# Patient Record
Sex: FEMALE | Race: WHITE | NOT HISPANIC OR LATINO
[De-identification: names, ages, dates, MRNs, and addresses within clinical notes are randomized per-mention and may not be internally consistent; named-entity substitution may affect disease eponyms.]

---

## 2017-03-29 ENCOUNTER — RESULT REVIEW (OUTPATIENT)
Age: 62
End: 2017-03-29

## 2017-11-29 LAB — CYTOLOGY CVX/VAG DOC THIN PREP: NORMAL

## 2018-06-07 ENCOUNTER — APPOINTMENT (OUTPATIENT)
Dept: FAMILY MEDICINE | Facility: CLINIC | Age: 63
End: 2018-06-07
Payer: MEDICARE

## 2018-06-07 VITALS
TEMPERATURE: 98 F | RESPIRATION RATE: 20 BRPM | DIASTOLIC BLOOD PRESSURE: 60 MMHG | HEART RATE: 60 BPM | BODY MASS INDEX: 24.27 KG/M2 | HEIGHT: 66 IN | WEIGHT: 151 LBS | SYSTOLIC BLOOD PRESSURE: 110 MMHG

## 2018-06-07 DIAGNOSIS — Z01.818 ENCOUNTER FOR OTHER PREPROCEDURAL EXAMINATION: ICD-10-CM

## 2018-06-07 PROCEDURE — 99214 OFFICE O/P EST MOD 30 MIN: CPT

## 2018-06-08 ENCOUNTER — APPOINTMENT (OUTPATIENT)
Dept: FAMILY MEDICINE | Facility: CLINIC | Age: 63
End: 2018-06-08

## 2019-03-20 ENCOUNTER — RESULT REVIEW (OUTPATIENT)
Age: 64
End: 2019-03-20

## 2019-08-12 ENCOUNTER — APPOINTMENT (OUTPATIENT)
Dept: FAMILY MEDICINE | Facility: CLINIC | Age: 64
End: 2019-08-12
Payer: MEDICARE

## 2019-08-12 VITALS
DIASTOLIC BLOOD PRESSURE: 90 MMHG | RESPIRATION RATE: 18 BRPM | BODY MASS INDEX: 24.43 KG/M2 | OXYGEN SATURATION: 98 % | HEART RATE: 94 BPM | HEIGHT: 66 IN | WEIGHT: 152 LBS | SYSTOLIC BLOOD PRESSURE: 140 MMHG

## 2019-08-12 DIAGNOSIS — K20.9 ESOPHAGITIS, UNSPECIFIED: ICD-10-CM

## 2019-08-12 PROCEDURE — 99213 OFFICE O/P EST LOW 20 MIN: CPT

## 2019-08-12 RX ORDER — METRONIDAZOLE 7.5 MG/G
0.75 CREAM TOPICAL
Qty: 45 | Refills: 0 | Status: DISCONTINUED | COMMUNITY
Start: 2018-05-30 | End: 2019-08-12

## 2019-09-11 ENCOUNTER — RX RENEWAL (OUTPATIENT)
Age: 64
End: 2019-09-11

## 2020-11-12 ENCOUNTER — APPOINTMENT (OUTPATIENT)
Dept: FAMILY MEDICINE | Facility: CLINIC | Age: 65
End: 2020-11-12
Payer: MEDICARE

## 2020-11-12 VITALS
WEIGHT: 152 LBS | HEART RATE: 86 BPM | RESPIRATION RATE: 18 BRPM | SYSTOLIC BLOOD PRESSURE: 136 MMHG | DIASTOLIC BLOOD PRESSURE: 98 MMHG | BODY MASS INDEX: 24.53 KG/M2 | OXYGEN SATURATION: 98 %

## 2020-11-12 VITALS — DIASTOLIC BLOOD PRESSURE: 88 MMHG | SYSTOLIC BLOOD PRESSURE: 130 MMHG

## 2020-11-12 DIAGNOSIS — Z00.00 ENCOUNTER FOR GENERAL ADULT MEDICAL EXAMINATION W/OUT ABNORMAL FINDINGS: ICD-10-CM

## 2020-11-12 DIAGNOSIS — Z23 ENCOUNTER FOR IMMUNIZATION: ICD-10-CM

## 2020-11-12 DIAGNOSIS — K63.5 POLYP OF COLON: ICD-10-CM

## 2020-11-12 LAB
ALBUMIN SERPL ELPH-MCNC: 4.7 G/DL
ALP BLD-CCNC: 111 U/L
ALT SERPL-CCNC: 12 U/L
ANION GAP SERPL CALC-SCNC: 14 MMOL/L
APPEARANCE: CLEAR
AST SERPL-CCNC: 14 U/L
BACTERIA: NEGATIVE
BASOPHILS # BLD AUTO: 0.04 K/UL
BASOPHILS NFR BLD AUTO: 0.7 %
BILIRUB SERPL-MCNC: 0.5 MG/DL
BILIRUBIN URINE: NEGATIVE
BLOOD URINE: NEGATIVE
BUN SERPL-MCNC: 11 MG/DL
CALCIUM SERPL-MCNC: 9.9 MG/DL
CHLORIDE SERPL-SCNC: 101 MMOL/L
CHOLEST SERPL-MCNC: 280 MG/DL
CO2 SERPL-SCNC: 28 MMOL/L
COLOR: COLORLESS
CREAT SERPL-MCNC: 0.59 MG/DL
EOSINOPHIL # BLD AUTO: 0.15 K/UL
EOSINOPHIL NFR BLD AUTO: 2.6 %
ESTIMATED AVERAGE GLUCOSE: 105 MG/DL
GLUCOSE QUALITATIVE U: NEGATIVE
GLUCOSE SERPL-MCNC: 99 MG/DL
HBA1C MFR BLD HPLC: 5.3 %
HCT VFR BLD CALC: 47.8 %
HDLC SERPL-MCNC: 91 MG/DL
HGB BLD-MCNC: 16 G/DL
HYALINE CASTS: 0 /LPF
IMM GRANULOCYTES NFR BLD AUTO: 0.4 %
KETONES URINE: NEGATIVE
LDLC SERPL CALC-MCNC: 164 MG/DL
LEUKOCYTE ESTERASE URINE: NEGATIVE
LYMPHOCYTES # BLD AUTO: 1.95 K/UL
LYMPHOCYTES NFR BLD AUTO: 34.2 %
MAN DIFF?: NORMAL
MCHC RBC-ENTMCNC: 31.7 PG
MCHC RBC-ENTMCNC: 33.5 GM/DL
MCV RBC AUTO: 94.8 FL
MICROSCOPIC-UA: NORMAL
MONOCYTES # BLD AUTO: 0.42 K/UL
MONOCYTES NFR BLD AUTO: 7.4 %
NEUTROPHILS # BLD AUTO: 3.13 K/UL
NEUTROPHILS NFR BLD AUTO: 54.7 %
NITRITE URINE: NEGATIVE
NONHDLC SERPL-MCNC: 190 MG/DL
PH URINE: 6.5
PLATELET # BLD AUTO: 225 K/UL
POTASSIUM SERPL-SCNC: 4.2 MMOL/L
PROT SERPL-MCNC: 6.4 G/DL
PROTEIN URINE: NEGATIVE
RBC # BLD: 5.04 M/UL
RBC # FLD: 12.6 %
RED BLOOD CELLS URINE: 1 /HPF
SODIUM SERPL-SCNC: 143 MMOL/L
SPECIFIC GRAVITY URINE: 1
SQUAMOUS EPITHELIAL CELLS: 0 /HPF
TRIGL SERPL-MCNC: 129 MG/DL
UROBILINOGEN URINE: NORMAL
WBC # FLD AUTO: 5.71 K/UL
WHITE BLOOD CELLS URINE: 0 /HPF

## 2020-11-12 PROCEDURE — 90686 IIV4 VACC NO PRSV 0.5 ML IM: CPT

## 2020-11-12 PROCEDURE — G0438: CPT

## 2020-11-12 PROCEDURE — 36415 COLL VENOUS BLD VENIPUNCTURE: CPT

## 2020-11-12 PROCEDURE — G0008: CPT

## 2020-11-12 RX ORDER — OMEPRAZOLE 40 MG/1
40 CAPSULE, DELAYED RELEASE ORAL
Qty: 30 | Refills: 0 | Status: DISCONTINUED | COMMUNITY
Start: 2019-08-12 | End: 2020-11-12

## 2020-11-14 LAB
25(OH)D3 SERPL-MCNC: 32.3 NG/ML
SARS-COV-2 IGG SERPL IA-ACNC: 0.08 INDEX
SARS-COV-2 IGG SERPL QL IA: NEGATIVE
TSH SERPL-ACNC: 2.32 UIU/ML

## 2021-03-03 ENCOUNTER — APPOINTMENT (OUTPATIENT)
Dept: OBGYN | Facility: CLINIC | Age: 66
End: 2021-03-03
Payer: MEDICARE

## 2021-03-03 VITALS
HEIGHT: 66 IN | BODY MASS INDEX: 24.43 KG/M2 | WEIGHT: 152 LBS | SYSTOLIC BLOOD PRESSURE: 132 MMHG | DIASTOLIC BLOOD PRESSURE: 82 MMHG

## 2021-03-03 DIAGNOSIS — D25.9 LEIOMYOMA OF UTERUS, UNSPECIFIED: ICD-10-CM

## 2021-03-03 LAB
BILIRUB UR QL STRIP: NORMAL
CLARITY UR: CLEAR
COLLECTION METHOD: NORMAL
GLUCOSE UR-MCNC: NORMAL
HCG UR QL: 0.2 EU/DL
HGB UR QL STRIP.AUTO: NORMAL
KETONES UR-MCNC: NORMAL
LEUKOCYTE ESTERASE UR QL STRIP: NORMAL
NITRITE UR QL STRIP: NORMAL
PH UR STRIP: 5.5
PROT UR STRIP-MCNC: NORMAL
SP GR UR STRIP: 1.02

## 2021-03-03 PROCEDURE — G0439: CPT

## 2021-03-03 PROCEDURE — 81003 URINALYSIS AUTO W/O SCOPE: CPT | Mod: QW

## 2021-03-03 PROCEDURE — G0101: CPT

## 2021-03-04 LAB — HPV HIGH+LOW RISK DNA PNL CVX: NOT DETECTED

## 2021-03-07 LAB — CYTOLOGY CVX/VAG DOC THIN PREP: NORMAL

## 2022-03-08 ENCOUNTER — APPOINTMENT (OUTPATIENT)
Dept: OBGYN | Facility: CLINIC | Age: 67
End: 2022-03-08
Payer: MEDICARE

## 2022-03-08 VITALS
SYSTOLIC BLOOD PRESSURE: 132 MMHG | WEIGHT: 153 LBS | BODY MASS INDEX: 24.59 KG/M2 | DIASTOLIC BLOOD PRESSURE: 80 MMHG | HEIGHT: 66 IN

## 2022-03-08 DIAGNOSIS — Z01.411 ENCOUNTER FOR GYNECOLOGICAL EXAMINATION (GENERAL) (ROUTINE) WITH ABNORMAL FINDINGS: ICD-10-CM

## 2022-03-08 PROCEDURE — G0328 FECAL BLOOD SCRN IMMUNOASSAY: CPT | Mod: GA,QW

## 2022-03-08 PROCEDURE — 99213 OFFICE O/P EST LOW 20 MIN: CPT | Mod: 25

## 2022-06-01 ENCOUNTER — ASOB RESULT (OUTPATIENT)
Age: 67
End: 2022-06-01

## 2022-06-01 ENCOUNTER — APPOINTMENT (OUTPATIENT)
Dept: OBGYN | Facility: CLINIC | Age: 67
End: 2022-06-01
Payer: MEDICARE

## 2022-06-01 PROCEDURE — 77080 DXA BONE DENSITY AXIAL: CPT

## 2022-06-01 PROCEDURE — 76830 TRANSVAGINAL US NON-OB: CPT

## 2022-11-08 ENCOUNTER — OFFICE (OUTPATIENT)
Dept: URBAN - METROPOLITAN AREA CLINIC 27 | Facility: CLINIC | Age: 67
Setting detail: OPHTHALMOLOGY
End: 2022-11-08
Payer: MEDICARE

## 2022-11-08 DIAGNOSIS — H11.153: ICD-10-CM

## 2022-11-08 DIAGNOSIS — H35.373: ICD-10-CM

## 2022-11-08 DIAGNOSIS — H52.4: ICD-10-CM

## 2022-11-08 DIAGNOSIS — H25.13: ICD-10-CM

## 2022-11-08 DIAGNOSIS — H10.45: ICD-10-CM

## 2022-11-08 DIAGNOSIS — H43.811: ICD-10-CM

## 2022-11-08 PROBLEM — H52.7 REFRACTIVE ERROR: Status: ACTIVE | Noted: 2022-11-08

## 2022-11-08 PROCEDURE — 92202 OPSCPY EXTND ON/MAC DRAW: CPT | Performed by: OPHTHALMOLOGY

## 2022-11-08 PROCEDURE — 92014 COMPRE OPH EXAM EST PT 1/>: CPT | Performed by: OPHTHALMOLOGY

## 2022-11-08 PROCEDURE — 92015 DETERMINE REFRACTIVE STATE: CPT | Performed by: OPHTHALMOLOGY

## 2022-11-08 PROCEDURE — 92134 CPTRZ OPH DX IMG PST SGM RTA: CPT | Performed by: OPHTHALMOLOGY

## 2022-11-08 ASSESSMENT — AXIALLENGTH_DERIVED
OD_AL: 21.2305
OD_AL: 21
OS_AL: 20.81
OS_AL: 20.88
OD_AL: 21.04
OS_AL: 20.69

## 2022-11-08 ASSESSMENT — REFRACTION_MANIFEST
OS_SPHERE: +2.75
OS_AXIS: 180
OD_SPHERE: +1.75
OD_SPHERE: +3.50
OS_ADD: +2.50
OS_SPHERE: +4.25
OS_CYLINDER: -1.75
OD_VA1: 20/30
OS_AXIS: 090
OD_CYLINDER: +1.25
OS_ADD: +2.50
OD_ADD: +2.50
OD_AXIS: 090
OD_AXIS: 010
OD_ADD: +2.50
OD_CYLINDER: -1.00
OS_VA1: 20/20-2
OS_CYLINDER: +1.75

## 2022-11-08 ASSESSMENT — REFRACTION_AUTOREFRACTION
OS_SPHERE: +2.75
OD_SPHERE: +2.25
OS_AXIS: 177
OS_CYLINDER: +2.50
OD_AXIS: 8
OD_CYLINDER: +1.75

## 2022-11-08 ASSESSMENT — REFRACTION_CURRENTRX
OS_CYLINDER: +1.50
OD_SPHERE: +1.75
OD_AXIS: 173
OD_ADD: +2.25
OS_ADD: +2.25
OS_VPRISM_DIRECTION: PROGS
OS_SPHERE: +2.75
OD_CYLINDER: +1.25
OS_AXIS: 1
OD_OVR_VA: 20/
OS_OVR_VA: 20/
OD_VPRISM_DIRECTION: PROGS

## 2022-11-08 ASSESSMENT — SPHEQUIV_DERIVED
OS_SPHEQUIV: 4
OD_SPHEQUIV: 3
OD_SPHEQUIV: 3.125
OS_SPHEQUIV: 3.375
OS_SPHEQUIV: 3.625
OD_SPHEQUIV: 2.375

## 2022-11-08 ASSESSMENT — TONOMETRY
OS_IOP_MMHG: 14
OD_IOP_MMHG: 18

## 2022-11-08 ASSESSMENT — CONFRONTATIONAL VISUAL FIELD TEST (CVF)
OD_FINDINGS: FULL
OS_FINDINGS: FULL

## 2022-11-08 ASSESSMENT — KERATOMETRY
METHOD_AUTO_MANUAL: AUTO
OD_K2POWER_DIOPTERS: 48.50
OS_K1POWER_DIOPTERS: 47.75
OS_AXISANGLE_DEGREES: 157
OD_K1POWER_DIOPTERS: 47.75
OD_AXISANGLE_DEGREES: 25
OS_K2POWER_DIOPTERS: 48.75

## 2022-11-08 ASSESSMENT — VISUAL ACUITY
OD_BCVA: 20/25-2
OS_BCVA: 20/20

## 2023-01-04 DIAGNOSIS — Z12.39 ENCOUNTER FOR OTHER SCREENING FOR MALIGNANT NEOPLASM OF BREAST: ICD-10-CM

## 2023-03-31 ENCOUNTER — APPOINTMENT (OUTPATIENT)
Dept: OBGYN | Facility: CLINIC | Age: 68
End: 2023-03-31
Payer: MEDICARE

## 2023-03-31 VITALS
HEIGHT: 66 IN | DIASTOLIC BLOOD PRESSURE: 82 MMHG | WEIGHT: 144 LBS | BODY MASS INDEX: 23.14 KG/M2 | SYSTOLIC BLOOD PRESSURE: 150 MMHG

## 2023-03-31 PROCEDURE — G0328 FECAL BLOOD SCRN IMMUNOASSAY: CPT | Mod: GA,QW

## 2023-03-31 PROCEDURE — G0101: CPT | Mod: GA

## 2023-03-31 PROCEDURE — 99213 OFFICE O/P EST LOW 20 MIN: CPT | Mod: 25

## 2023-05-09 ENCOUNTER — OFFICE (OUTPATIENT)
Dept: URBAN - METROPOLITAN AREA CLINIC 27 | Facility: CLINIC | Age: 68
Setting detail: OPHTHALMOLOGY
End: 2023-05-09
Payer: MEDICARE

## 2023-05-09 DIAGNOSIS — H11.153: ICD-10-CM

## 2023-05-09 DIAGNOSIS — H35.373: ICD-10-CM

## 2023-05-09 DIAGNOSIS — H10.45: ICD-10-CM

## 2023-05-09 DIAGNOSIS — H25.13: ICD-10-CM

## 2023-05-09 PROCEDURE — 92134 CPTRZ OPH DX IMG PST SGM RTA: CPT | Performed by: OPHTHALMOLOGY

## 2023-05-09 PROCEDURE — 99213 OFFICE O/P EST LOW 20 MIN: CPT | Performed by: OPHTHALMOLOGY

## 2023-05-09 ASSESSMENT — REFRACTION_AUTOREFRACTION
OD_CYLINDER: +1.75
OD_SPHERE: +2.25
OS_SPHERE: +3.00
OD_AXIS: 9
OS_AXIS: 2
OS_CYLINDER: +2.50

## 2023-05-09 ASSESSMENT — REFRACTION_CURRENTRX
OD_OVR_VA: 20/
OS_ADD: +2.25
OD_AXIS: 8
OD_SPHERE: +1.75
OD_CYLINDER: +1.25
OS_VPRISM_DIRECTION: PROGS
OD_VPRISM_DIRECTION: PROGS
OS_AXIS: 166
OS_SPHERE: +2.75
OS_CYLINDER: +1.50
OS_OVR_VA: 20/
OD_ADD: +2.25

## 2023-05-09 ASSESSMENT — REFRACTION_MANIFEST
OS_VA1: 20/20-2
OS_ADD: +2.50
OS_SPHERE: +2.75
OD_SPHERE: +3.50
OD_AXIS: 090
OS_ADD: +2.50
OD_AXIS: 010
OS_SPHERE: +4.25
OD_CYLINDER: -1.00
OD_SPHERE: +1.75
OD_CYLINDER: +1.25
OS_AXIS: 090
OS_CYLINDER: +1.75
OD_ADD: +2.50
OS_CYLINDER: -1.75
OS_AXIS: 180
OD_VA1: 20/30
OD_ADD: +2.50

## 2023-05-09 ASSESSMENT — SPHEQUIV_DERIVED
OD_SPHEQUIV: 3
OD_SPHEQUIV: 3.125
OS_SPHEQUIV: 3.375
OS_SPHEQUIV: 3.625
OS_SPHEQUIV: 4.25
OD_SPHEQUIV: 2.375

## 2023-05-09 ASSESSMENT — AXIALLENGTH_DERIVED
OD_AL: 21.3051
OS_AL: 20.92
OS_AL: 20.84
OD_AL: 21.07
OS_AL: 20.65
OD_AL: 21.11

## 2023-05-09 ASSESSMENT — KERATOMETRY
OD_K2POWER_DIOPTERS: 48.25
METHOD_AUTO_MANUAL: AUTO
OD_AXISANGLE_DEGREES: 11
OS_K2POWER_DIOPTERS: 48.50
OS_K1POWER_DIOPTERS: 47.75
OD_K1POWER_DIOPTERS: 47.50
OS_AXISANGLE_DEGREES: 169

## 2023-05-09 ASSESSMENT — VISUAL ACUITY
OD_BCVA: 20/20-3
OS_BCVA: 20/20-1

## 2023-05-09 ASSESSMENT — TONOMETRY
OD_IOP_MMHG: 15
OS_IOP_MMHG: 14

## 2023-06-07 ENCOUNTER — APPOINTMENT (OUTPATIENT)
Dept: OBGYN | Facility: CLINIC | Age: 68
End: 2023-06-07
Payer: MEDICARE

## 2023-06-07 PROCEDURE — 76830 TRANSVAGINAL US NON-OB: CPT

## 2023-11-07 ENCOUNTER — OFFICE (OUTPATIENT)
Dept: URBAN - METROPOLITAN AREA CLINIC 27 | Facility: CLINIC | Age: 68
Setting detail: OPHTHALMOLOGY
End: 2023-11-07
Payer: MEDICARE

## 2023-11-07 DIAGNOSIS — H25.13: ICD-10-CM

## 2023-11-07 DIAGNOSIS — H43.811: ICD-10-CM

## 2023-11-07 DIAGNOSIS — G43.109: ICD-10-CM

## 2023-11-07 DIAGNOSIS — H11.153: ICD-10-CM

## 2023-11-07 DIAGNOSIS — H10.45: ICD-10-CM

## 2023-11-07 DIAGNOSIS — H35.373: ICD-10-CM

## 2023-11-07 PROCEDURE — 92014 COMPRE OPH EXAM EST PT 1/>: CPT | Performed by: OPHTHALMOLOGY

## 2023-11-07 PROCEDURE — 92134 CPTRZ OPH DX IMG PST SGM RTA: CPT | Performed by: OPHTHALMOLOGY

## 2023-11-07 ASSESSMENT — REFRACTION_CURRENTRX
OS_SPHERE: +2.75
OS_OVR_VA: 20/
OD_VPRISM_DIRECTION: PROGS
OD_ADD: +2.25
OD_OVR_VA: 20/
OD_AXIS: 8
OS_ADD: +2.25
OS_CYLINDER: +1.50
OD_CYLINDER: +1.25
OS_AXIS: 166
OS_VPRISM_DIRECTION: PROGS
OD_SPHERE: +1.75

## 2023-11-07 ASSESSMENT — REFRACTION_AUTOREFRACTION
OD_SPHERE: +2.00
OD_CYLINDER: +1.75
OD_AXIS: 010
OS_CYLINDER: +2.25
OS_AXIS: 004
OS_SPHERE: +3.00

## 2023-11-07 ASSESSMENT — REFRACTION_MANIFEST
OS_VA1: 20/20-2
OD_SPHERE: +3.50
OD_AXIS: 010
OD_CYLINDER: -1.00
OD_AXIS: 090
OS_ADD: +2.50
OD_SPHERE: +1.75
OS_CYLINDER: -1.75
OS_SPHERE: +4.25
OD_CYLINDER: +1.25
OS_CYLINDER: +1.75
OS_SPHERE: +2.75
OD_VA1: 20/30
OD_ADD: +2.50
OS_AXIS: 180
OD_ADD: +2.50
OS_ADD: +2.50
OS_AXIS: 090

## 2023-11-07 ASSESSMENT — SPHEQUIV_DERIVED
OD_SPHEQUIV: 2.375
OD_SPHEQUIV: 3
OS_SPHEQUIV: 3.375
OD_SPHEQUIV: 2.875
OS_SPHEQUIV: 3.625
OS_SPHEQUIV: 4.125

## 2023-11-07 ASSESSMENT — CONFRONTATIONAL VISUAL FIELD TEST (CVF)
OS_FINDINGS: FULL
OD_FINDINGS: FULL

## 2024-02-01 DIAGNOSIS — Z12.39 ENCOUNTER FOR OTHER SCREENING FOR MALIGNANT NEOPLASM OF BREAST: ICD-10-CM

## 2024-02-01 DIAGNOSIS — R92.30 DENSE BREASTS, UNSPECIFIED: ICD-10-CM

## 2024-04-26 ENCOUNTER — APPOINTMENT (OUTPATIENT)
Dept: OBGYN | Facility: CLINIC | Age: 69
End: 2024-04-26

## 2024-04-26 VITALS
HEIGHT: 66 IN | BODY MASS INDEX: 23.3 KG/M2 | DIASTOLIC BLOOD PRESSURE: 88 MMHG | SYSTOLIC BLOOD PRESSURE: 150 MMHG | WEIGHT: 145 LBS

## 2024-04-26 DIAGNOSIS — D25.9 LEIOMYOMA OF UTERUS, UNSPECIFIED: ICD-10-CM

## 2024-04-26 DIAGNOSIS — Z01.419 ENCOUNTER FOR GYNECOLOGICAL EXAMINATION (GENERAL) (ROUTINE) W/OUT ABNORMAL FINDINGS: ICD-10-CM

## 2024-04-26 PROCEDURE — G0328 FECAL BLOOD SCRN IMMUNOASSAY: CPT | Mod: QW

## 2024-04-26 PROCEDURE — G0101: CPT

## 2024-04-26 PROCEDURE — 99212 OFFICE O/P EST SF 10 MIN: CPT | Mod: 25

## 2024-04-30 LAB — HPV HIGH+LOW RISK DNA PNL CVX: NOT DETECTED

## 2024-05-01 LAB — CYTOLOGY CVX/VAG DOC THIN PREP: NORMAL

## 2024-05-21 ENCOUNTER — OFFICE (OUTPATIENT)
Dept: URBAN - METROPOLITAN AREA CLINIC 27 | Facility: CLINIC | Age: 69
Setting detail: OPHTHALMOLOGY
End: 2024-05-21
Payer: MEDICARE

## 2024-05-21 DIAGNOSIS — H11.153: ICD-10-CM

## 2024-05-21 DIAGNOSIS — H25.13: ICD-10-CM

## 2024-05-21 DIAGNOSIS — G43.109: ICD-10-CM

## 2024-05-21 DIAGNOSIS — H10.45: ICD-10-CM

## 2024-05-21 DIAGNOSIS — H35.373: ICD-10-CM

## 2024-05-21 PROCEDURE — 92134 CPTRZ OPH DX IMG PST SGM RTA: CPT | Performed by: OPHTHALMOLOGY

## 2024-05-21 PROCEDURE — 99213 OFFICE O/P EST LOW 20 MIN: CPT | Performed by: OPHTHALMOLOGY

## 2024-05-22 NOTE — HISTORY OF PRESENT ILLNESS
[FreeTextEntry1] : 04/26/2024 H[facility] FILE 69-year-old female presents for an annual gyn exam.  Patient offers no complaints. No abnormal vaginal bleeding, pain, or vaginal discharge. Currently not sexually active.  Reviewed last mammogram from 2/24: BI-RADS1. Followed by Dr. Ritter.  Reviewed last bone density from 6/22: osteopenia. Reviewed last TVUS from 6/23: anteverted heterogeneous uterus w/ discreet myomas, 2.09 mm endometrium. Pt w/ hx pancreatic cyst, for which she is screened every two years w/ abdominal MRI. Followed by PCP Dr. Kuo.   Denies changes in medical status, medications, serious illness, hospitalizations, and surgeries. Reviewed patient's current medications.

## 2024-05-22 NOTE — PHYSICAL EXAM
[Chaperone Present] : A chaperone was present in the examining room during all aspects of the physical examination [[id number]] : A chaperone was present during the pelvic exam. [Appropriately responsive] : appropriately responsive [Alert] : alert [No Acute Distress] : no acute distress [No Lymphadenopathy] : no lymphadenopathy [Regular Rate Rhythm] : regular rate rhythm [No Murmurs] : no murmurs [Clear to Auscultation B/L] : clear to auscultation bilaterally [Soft] : soft [Non-tender] : non-tender [Non-distended] : non-distended [No HSM] : No HSM [No Lesions] : no lesions [No Mass] : no mass [Oriented x3] : oriented x3 [Examination Of The Breasts] : a normal appearance [No Masses] : no breast masses were palpable [Labia Majora] : normal [Labia Minora] : normal [Normal] : normal [Uterine Adnexae] : normal [FreeTextEntry2] : Naz Kearns  [FreeTextEntry9] : Guaiac negative, no masses

## 2024-05-22 NOTE — SIGNATURES
[TextEntry] : This note was authored by Naz Kearns working as a scribe for Dr. Catracho Gracia.   I, Dr. Catracho Gracia, have reviewed the content of this note and confirm it is true and accurate. I personally performed the history and physical examination and made all the decisions. 04/26/2024

## 2024-05-22 NOTE — PLAN
[FreeTextEntry1] : Health Maintenance: Normal gyn examination. Pap smear performed. Advised to schedule bone density 6/24. F/u with [facility] for screening laboratories. Nutrition and exercised discussed.  Fibroids: Schedule pelvic sono to follow stability 6/24  RTO in 1 year, or PRN.

## 2024-06-05 ENCOUNTER — APPOINTMENT (OUTPATIENT)
Dept: OBGYN | Facility: CLINIC | Age: 69
End: 2024-06-05
Payer: MEDICARE

## 2024-06-05 ENCOUNTER — ASOB RESULT (OUTPATIENT)
Age: 69
End: 2024-06-05

## 2024-06-05 PROCEDURE — 77080 DXA BONE DENSITY AXIAL: CPT

## 2024-06-05 PROCEDURE — 76830 TRANSVAGINAL US NON-OB: CPT

## 2024-11-01 ENCOUNTER — APPOINTMENT (OUTPATIENT)
Dept: FAMILY MEDICINE | Facility: CLINIC | Age: 69
End: 2024-11-01
Payer: MEDICARE

## 2024-11-01 VITALS
HEART RATE: 90 BPM | OXYGEN SATURATION: 97 % | SYSTOLIC BLOOD PRESSURE: 190 MMHG | HEIGHT: 66 IN | DIASTOLIC BLOOD PRESSURE: 90 MMHG | RESPIRATION RATE: 18 BRPM | TEMPERATURE: 97.9 F | WEIGHT: 150 LBS | BODY MASS INDEX: 24.11 KG/M2

## 2024-11-01 DIAGNOSIS — Z01.818 ENCOUNTER FOR OTHER PREPROCEDURAL EXAMINATION: ICD-10-CM

## 2024-11-01 PROCEDURE — 99214 OFFICE O/P EST MOD 30 MIN: CPT

## 2024-11-19 ENCOUNTER — OFFICE (OUTPATIENT)
Dept: URBAN - METROPOLITAN AREA CLINIC 27 | Facility: CLINIC | Age: 69
Setting detail: OPHTHALMOLOGY
End: 2024-11-19
Payer: MEDICARE

## 2024-11-19 DIAGNOSIS — H35.373: ICD-10-CM

## 2024-11-19 DIAGNOSIS — H25.13: ICD-10-CM

## 2024-11-19 DIAGNOSIS — G43.109: ICD-10-CM

## 2024-11-19 DIAGNOSIS — H11.153: ICD-10-CM

## 2024-11-19 DIAGNOSIS — H10.45: ICD-10-CM

## 2024-11-19 DIAGNOSIS — H43.811: ICD-10-CM

## 2024-11-19 PROCEDURE — 92014 COMPRE OPH EXAM EST PT 1/>: CPT | Performed by: OPHTHALMOLOGY

## 2024-11-19 PROCEDURE — 92134 CPTRZ OPH DX IMG PST SGM RTA: CPT | Performed by: OPHTHALMOLOGY

## 2024-11-19 PROCEDURE — 92202 OPSCPY EXTND ON/MAC DRAW: CPT | Performed by: OPHTHALMOLOGY

## 2024-11-19 ASSESSMENT — TONOMETRY
OD_IOP_MMHG: 13
OS_IOP_MMHG: 16

## 2024-11-19 ASSESSMENT — REFRACTION_MANIFEST
OD_AXIS: 090
OD_CYLINDER: +1.25
OS_ADD: +2.50
OD_SPHERE: +1.75
OS_CYLINDER: +1.75
OS_SPHERE: +2.75
OS_ADD: +2.50
OD_ADD: +2.50
OS_AXIS: 090
OS_VA1: 20/20-2
OD_AXIS: 010
OS_CYLINDER: -1.75
OD_CYLINDER: -1.00
OD_VA1: 20/30
OS_AXIS: 180
OS_SPHERE: +4.25
OD_ADD: +2.50
OD_SPHERE: +3.50

## 2024-11-19 ASSESSMENT — REFRACTION_AUTOREFRACTION
OD_SPHERE: +2.25
OS_CYLINDER: +2.50
OS_AXIS: 180
OD_AXIS: 010
OS_SPHERE: +3.00
OD_CYLINDER: +1.75

## 2024-11-19 ASSESSMENT — REFRACTION_CURRENTRX
OD_SPHERE: +1.75
OS_VPRISM_DIRECTION: PROGS
OD_VPRISM_DIRECTION: PROGS
OD_OVR_VA: 20/
OS_AXIS: 166
OD_CYLINDER: +1.25
OD_AXIS: 8
OS_ADD: +2.25
OS_OVR_VA: 20/
OS_CYLINDER: +1.50
OD_ADD: +2.25
OS_SPHERE: +2.75

## 2024-11-19 ASSESSMENT — KERATOMETRY
OD_AXISANGLE_DEGREES: 19
OS_K2POWER_DIOPTERS: 48.50
OS_K1POWER_DIOPTERS: 47.25
OD_K1POWER_DIOPTERS: 47.75
OD_K2POWER_DIOPTERS: 48.50
OS_AXISANGLE_DEGREES: 172
METHOD_AUTO_MANUAL: AUTO

## 2024-11-19 ASSESSMENT — CONFRONTATIONAL VISUAL FIELD TEST (CVF)
OD_FINDINGS: FULL
OS_FINDINGS: FULL

## 2024-11-19 ASSESSMENT — VISUAL ACUITY
OD_BCVA: 20/30-2
OS_BCVA: 20/30-1

## 2024-12-02 DIAGNOSIS — Z12.31 ENCOUNTER FOR SCREENING MAMMOGRAM FOR MALIGNANT NEOPLASM OF BREAST: ICD-10-CM

## 2025-02-12 ENCOUNTER — APPOINTMENT (OUTPATIENT)
Dept: OPHTHALMOLOGY | Facility: CLINIC | Age: 70
End: 2025-02-12

## 2025-03-11 ENCOUNTER — APPOINTMENT (OUTPATIENT)
Dept: FAMILY MEDICINE | Facility: CLINIC | Age: 70
End: 2025-03-11
Payer: MEDICARE

## 2025-03-11 VITALS
BODY MASS INDEX: 24.28 KG/M2 | HEIGHT: 66 IN | HEART RATE: 99 BPM | RESPIRATION RATE: 16 BRPM | SYSTOLIC BLOOD PRESSURE: 198 MMHG | DIASTOLIC BLOOD PRESSURE: 95 MMHG | OXYGEN SATURATION: 97 % | WEIGHT: 151.06 LBS | TEMPERATURE: 97.7 F

## 2025-03-11 DIAGNOSIS — I10 ESSENTIAL (PRIMARY) HYPERTENSION: ICD-10-CM

## 2025-03-11 PROCEDURE — 99203 OFFICE O/P NEW LOW 30 MIN: CPT

## 2025-03-11 RX ORDER — LOSARTAN POTASSIUM AND HYDROCHLOROTHIAZIDE 12.5; 5 MG/1; MG/1
50-12.5 TABLET ORAL DAILY
Qty: 90 | Refills: 0 | Status: ACTIVE | COMMUNITY
Start: 2025-03-11 | End: 1900-01-01

## 2025-04-08 ENCOUNTER — APPOINTMENT (OUTPATIENT)
Dept: FAMILY MEDICINE | Facility: CLINIC | Age: 70
End: 2025-04-08
Payer: MEDICARE

## 2025-04-08 VITALS
HEART RATE: 90 BPM | RESPIRATION RATE: 16 BRPM | DIASTOLIC BLOOD PRESSURE: 72 MMHG | BODY MASS INDEX: 23.3 KG/M2 | OXYGEN SATURATION: 97 % | WEIGHT: 145 LBS | SYSTOLIC BLOOD PRESSURE: 168 MMHG | HEIGHT: 66 IN | TEMPERATURE: 97.1 F

## 2025-04-08 DIAGNOSIS — I10 ESSENTIAL (PRIMARY) HYPERTENSION: ICD-10-CM

## 2025-04-08 PROCEDURE — 99213 OFFICE O/P EST LOW 20 MIN: CPT

## 2025-05-20 ENCOUNTER — OFFICE (OUTPATIENT)
Dept: URBAN - METROPOLITAN AREA CLINIC 27 | Facility: CLINIC | Age: 70
Setting detail: OPHTHALMOLOGY
End: 2025-05-20
Payer: MEDICARE

## 2025-05-20 DIAGNOSIS — H25.13: ICD-10-CM

## 2025-05-20 DIAGNOSIS — H35.371: ICD-10-CM

## 2025-05-20 DIAGNOSIS — H11.153: ICD-10-CM

## 2025-05-20 DIAGNOSIS — H10.45: ICD-10-CM

## 2025-05-20 DIAGNOSIS — G43.109: ICD-10-CM

## 2025-05-20 PROCEDURE — 92134 CPTRZ OPH DX IMG PST SGM RTA: CPT | Performed by: OPHTHALMOLOGY

## 2025-05-20 PROCEDURE — 99213 OFFICE O/P EST LOW 20 MIN: CPT | Performed by: OPHTHALMOLOGY

## 2025-05-20 ASSESSMENT — REFRACTION_CURRENTRX
OS_ADD: +2.25
OS_VPRISM_DIRECTION: PROGS
OS_AXIS: 166
OD_OVR_VA: 20/
OD_SPHERE: +1.75
OS_CYLINDER: +1.50
OD_CYLINDER: +1.25
OD_VPRISM_DIRECTION: PROGS
OS_SPHERE: +2.75
OD_ADD: +2.25
OD_AXIS: 8
OS_OVR_VA: 20/

## 2025-05-20 ASSESSMENT — REFRACTION_AUTOREFRACTION
OS_AXIS: 177
OS_SPHERE: +2.75
OS_CYLINDER: +2.50
OD_CYLINDER: +2.00
OD_SPHERE: +2.25
OD_AXIS: 007

## 2025-05-20 ASSESSMENT — REFRACTION_MANIFEST
OD_ADD: +2.50
OS_AXIS: 090
OS_VA1: 20/20-2
OD_AXIS: 010
OD_VA1: 20/30
OS_CYLINDER: +1.75
OD_CYLINDER: +1.25
OS_CYLINDER: -1.75
OS_SPHERE: +4.25
OD_SPHERE: +1.75
OS_ADD: +2.50
OD_ADD: +2.50
OS_AXIS: 180
OS_ADD: +2.50
OD_AXIS: 090
OD_CYLINDER: -1.00
OD_SPHERE: +3.50
OS_SPHERE: +2.75

## 2025-05-20 ASSESSMENT — VISUAL ACUITY
OS_BCVA: 20/20-3
OD_BCVA: 20/20

## 2025-05-20 ASSESSMENT — KERATOMETRY
METHOD_AUTO_MANUAL: AUTO
OD_K1POWER_DIOPTERS: 47.50
OD_K2POWER_DIOPTERS: 48.25
OS_K1POWER_DIOPTERS: 47.50
OS_K2POWER_DIOPTERS: 48.50
OD_AXISANGLE_DEGREES: 14
OS_AXISANGLE_DEGREES: 170

## 2025-05-20 ASSESSMENT — TONOMETRY
OD_IOP_MMHG: 15
OS_IOP_MMHG: 15

## 2025-05-27 ENCOUNTER — NON-APPOINTMENT (OUTPATIENT)
Age: 70
End: 2025-05-27

## 2025-05-27 ENCOUNTER — APPOINTMENT (OUTPATIENT)
Dept: OBGYN | Facility: CLINIC | Age: 70
End: 2025-05-27
Payer: MEDICARE

## 2025-05-27 DIAGNOSIS — Z01.419 ENCOUNTER FOR GYNECOLOGICAL EXAMINATION (GENERAL) (ROUTINE) W/OUT ABNORMAL FINDINGS: ICD-10-CM

## 2025-05-27 DIAGNOSIS — D25.9 LEIOMYOMA OF UTERUS, UNSPECIFIED: ICD-10-CM

## 2025-05-27 PROCEDURE — G0101: CPT | Mod: GA

## 2025-05-27 PROCEDURE — 99459 PELVIC EXAMINATION: CPT

## 2025-05-27 PROCEDURE — G0328 FECAL BLOOD SCRN IMMUNOASSAY: CPT | Mod: GA,QW

## 2025-05-27 PROCEDURE — 99213 OFFICE O/P EST LOW 20 MIN: CPT | Mod: 25

## 2025-06-27 ENCOUNTER — APPOINTMENT (OUTPATIENT)
Dept: OBGYN | Facility: CLINIC | Age: 70
End: 2025-06-27

## 2025-07-16 ENCOUNTER — APPOINTMENT (OUTPATIENT)
Dept: OBGYN | Facility: CLINIC | Age: 70
End: 2025-07-16
Payer: MEDICARE

## 2025-07-16 ENCOUNTER — ASOB RESULT (OUTPATIENT)
Age: 70
End: 2025-07-16

## 2025-07-16 PROCEDURE — 76856 US EXAM PELVIC COMPLETE: CPT
